# Patient Record
Sex: MALE | Employment: OTHER | URBAN - METROPOLITAN AREA
[De-identification: names, ages, dates, MRNs, and addresses within clinical notes are randomized per-mention and may not be internally consistent; named-entity substitution may affect disease eponyms.]

---

## 2023-09-05 ENCOUNTER — OFFICE VISIT (OUTPATIENT)
Dept: PRIMARY CARE | Facility: CLINIC | Age: 78
End: 2023-09-05
Payer: MEDICARE

## 2023-09-05 VITALS
BODY MASS INDEX: 26.97 KG/M2 | DIASTOLIC BLOOD PRESSURE: 79 MMHG | HEART RATE: 70 BPM | SYSTOLIC BLOOD PRESSURE: 144 MMHG | TEMPERATURE: 97.3 F | WEIGHT: 171.8 LBS | HEIGHT: 67 IN

## 2023-09-05 DIAGNOSIS — R07.89 ATYPICAL CHEST PAIN: ICD-10-CM

## 2023-09-05 DIAGNOSIS — R07.81 RIB PAIN ON RIGHT SIDE: ICD-10-CM

## 2023-09-05 DIAGNOSIS — E55.9 VITAMIN D DEFICIENCY: ICD-10-CM

## 2023-09-05 DIAGNOSIS — E78.5 HYPERLIPIDEMIA, UNSPECIFIED HYPERLIPIDEMIA TYPE: ICD-10-CM

## 2023-09-05 DIAGNOSIS — Z12.5 PROSTATE CANCER SCREENING: ICD-10-CM

## 2023-09-05 DIAGNOSIS — R53.83 OTHER FATIGUE: ICD-10-CM

## 2023-09-05 PROCEDURE — 1159F MED LIST DOCD IN RCRD: CPT | Performed by: FAMILY MEDICINE

## 2023-09-05 PROCEDURE — 1160F RVW MEDS BY RX/DR IN RCRD: CPT | Performed by: FAMILY MEDICINE

## 2023-09-05 PROCEDURE — 99213 OFFICE O/P EST LOW 20 MIN: CPT | Performed by: FAMILY MEDICINE

## 2023-09-05 PROCEDURE — 1036F TOBACCO NON-USER: CPT | Performed by: FAMILY MEDICINE

## 2023-09-05 RX ORDER — MULTIVIT-MIN/FA/LYCOPEN/LUTEIN .4-300-25
1 TABLET ORAL DAILY
COMMUNITY
Start: 2016-06-01

## 2023-09-05 RX ORDER — FERROUS SULFATE, DRIED 160(50) MG
TABLET, EXTENDED RELEASE ORAL
COMMUNITY
Start: 2019-09-11

## 2023-09-05 RX ORDER — VIT C/E/ZN/COPPR/LUTEIN/ZEAXAN 250MG-90MG
CAPSULE ORAL
COMMUNITY
Start: 2016-06-01

## 2023-09-05 RX ORDER — SPIRONOLACTONE 25 MG
TABLET ORAL
COMMUNITY
Start: 2016-06-01

## 2023-09-05 RX ORDER — MULTIVIT-MIN/IRON/FOLIC ACID/K 18-600-40
1 CAPSULE ORAL DAILY
COMMUNITY
Start: 2016-06-01

## 2023-09-05 ASSESSMENT — PATIENT HEALTH QUESTIONNAIRE - PHQ9
2. FEELING DOWN, DEPRESSED OR HOPELESS: NOT AT ALL
1. LITTLE INTEREST OR PLEASURE IN DOING THINGS: NOT AT ALL
SUM OF ALL RESPONSES TO PHQ9 QUESTIONS 1 AND 2: 0

## 2023-09-05 NOTE — PROGRESS NOTES
"Subjective   Patient ID: Neymar Black is a 78 y.o. male who presents for Chest Pain (Right lower rib pain for 4-5 months./).    HPI   78 year-old male presents co a palpable lump right anterior lower rib with on and off pain for past 4-5 mo  No recent injury but did have a piece of wood kick back from his table saw and hit him in this area a couple yrs ago    Tender to the touch  No redness   Slight swelling  Occ feels on right side as well  No numbness or tingling  No rash  Not assoc with eating; no nausea or vomitting or reflux  Pain can range from 0-10  No assoc sob or breathing trouble  Did have some work exposure in the past    Saw dr arias 2 yr ago and was told he had a lipoma in right axillary area- may be getting  little bigger per pt      Also saw him for biliary dyskinesia at the time but mild symptoms at that time and no recent issues.   Had a normal EGD  and US but HIDA showed dec EF     Last seen 10/2022 for MWV      hx BPH- does not see a urologist  hx RICK-uses cpap sees dr paulson   hx palpitations- sees cardio dr lu  monitors bp/echo  bicuspid aortic valve     hx HLD- not on any meds     BMI 26; wt is stable  had weighed 300lbs in past.   exercises and trying to eat a healthy diet     Flu shot-will get today  Other immunizations are UTD  .  he denies any chest pains, palpitations, edema, shortness of breath, abdominal pains, reflux, nausea, vomiting, diarrhea, constipation, blood in stool, melena.      8/2019 Colonoscopy f;u 5 yrs; [facility]hman     monitors  BP at home and is good      Review of Systems  ROS as stated in HPI    Objective   /79   Pulse 70   Temp 36.3 °C (97.3 °F)   Ht 1.702 m (5' 7\")   Wt 77.9 kg (171 lb 12.8 oz)   BMI 26.91 kg/m²     Physical Exam  Constitutional: A&O; NAD  Heart: RRR     Chest: Lungs CTA bilateral;  right lower anterior rib with point tenderness and slight swelling; no redness or warmth; no rash  Abd: Soft, Nontender, Nondistended  Neuro: No gross " neuro deficits     Psych: Judgment, orientation, mood, affect, insight wnl   Assessment/Plan   Problem List Items Addressed This Visit    None  Visit Diagnoses       Rib pain on left side    -  Primary    Relevant Orders    XR chest 2 views    XR ribs left 2 views    Atypical chest pain        Relevant Orders    XR chest 2 views    XR ribs left 2 views    Prostate cancer screening        Relevant Orders    Prostate Specific Antigen, Screen    Other fatigue        Relevant Orders    CBC    Hyperlipidemia, unspecified hyperlipidemia type        Relevant Orders    Lipid Panel    Comprehensive Metabolic Panel    Vitamin D deficiency        Relevant Orders    Vitamin D 25-Hydroxy,Total (for eval of Vitamin D levels)          Check xray left ribs and CXR  Fu with dr arias re: lipoma   Check labs prior to MWV  Fu in nov for MWV  8/2019 Colonoscopy f;u 5 yrs; [facility]  Flu shot given today  Other immunizations are UTD   healthy lifestyle-diet, exercise.   f/u with specialists  Cont to monitor BP

## 2023-09-06 DIAGNOSIS — R07.81 RIB PAIN ON RIGHT SIDE: ICD-10-CM

## 2023-09-12 ENCOUNTER — TELEPHONE (OUTPATIENT)
Dept: PRIMARY CARE | Facility: CLINIC | Age: 78
End: 2023-09-12
Payer: MEDICARE

## 2023-09-12 NOTE — TELEPHONE ENCOUNTER
Patient's chest x-ray was clear   Please let pt know his right rib xray was normal.  I recommend he fu if persists or worsens

## 2023-10-18 LAB
NON-UH HIE A/G RATIO: 1.6
NON-UH HIE ALB: 3.9 G/DL (ref 3.4–5)
NON-UH HIE ALK PHOS: 59 UNIT/L (ref 45–117)
NON-UH HIE BILIRUBIN, TOTAL: 1.7 MG/DL (ref 0.3–1.2)
NON-UH HIE BUN/CREAT RATIO: 20
NON-UH HIE BUN: 24 MG/DL (ref 9–23)
NON-UH HIE CALCIUM: 9.3 MG/DL (ref 8.7–10.4)
NON-UH HIE CALCULATED LDL CHOLESTEROL: 142 MG/DL (ref 60–130)
NON-UH HIE CALCULATED OSMOLALITY: 285 MOSM/KG (ref 275–295)
NON-UH HIE CHLORIDE: 105 MMOL/L (ref 98–107)
NON-UH HIE CHOLESTEROL: 207 MG/DL (ref 100–200)
NON-UH HIE CO2, VENOUS: 29 MMOL/L (ref 20–31)
NON-UH HIE CREATININE: 1.2 MG/DL (ref 0.6–1.1)
NON-UH HIE GFR AA: >60
NON-UH HIE GLOBULIN: 2.5 G/DL
NON-UH HIE GLOMERULAR FILTRATION RATE: 59 ML/MIN/1.73M?
NON-UH HIE GLUCOSE: 83 MG/DL (ref 74–106)
NON-UH HIE GOT: 31 UNIT/L (ref 15–37)
NON-UH HIE GPT: 24 UNIT/L (ref 10–49)
NON-UH HIE HCT: 44.4 % (ref 41–52)
NON-UH HIE HDL CHOLESTEROL: 50 MG/DL (ref 40–60)
NON-UH HIE HGB: 15.2 G/DL (ref 13.5–17.5)
NON-UH HIE INSTR WBC ND: 6.3
NON-UH HIE K: 4 MMOL/L (ref 3.5–5.1)
NON-UH HIE MCH: 32.5 PG (ref 27–34)
NON-UH HIE MCHC: 34.3 G/DL (ref 32–37)
NON-UH HIE MCV: 94.8 FL (ref 80–100)
NON-UH HIE MPV: 7.7 FL (ref 7.4–10.4)
NON-UH HIE NA: 141 MMOL/L (ref 135–145)
NON-UH HIE PLATELET: 200 X10 (ref 150–450)
NON-UH HIE PROSTATIC SPECIFIC AG SCREEN: 0.8 NG/ML (ref 0–4)
NON-UH HIE RBC: 4.69 X10 (ref 4.7–6.1)
NON-UH HIE RDW: 13.1 % (ref 11.5–14.5)
NON-UH HIE TOTAL CHOL/HDL CHOL RATIO: 4.1
NON-UH HIE TOTAL PROTEIN: 6.4 G/DL (ref 5.7–8.2)
NON-UH HIE TRIGLYCERIDES: 75 MG/DL (ref 30–150)
NON-UH HIE VIT D 25: 62 NG/ML
NON-UH HIE WBC: 6.3 X10 (ref 4.5–11)

## 2023-11-05 NOTE — PROGRESS NOTES
"Subjective   Reason for Visit: Neymar Black is an 78 y.o. male here for a Medicare Wellness visit.     Past Medical, Surgical, and Family History reviewed and updated in chart.    Reviewed all medications by prescribing practitioner or clinical pharmacist (such as prescriptions, OTCs, herbal therapies and supplements) and documented in the medical record.    HPI  78-year-old male presents for Medicare wellness visit      recent labs:       Cr 1.2 bili 1.7   Vit D, psa, cbc wnl     hx BPH- does not see a urologist  PSA screen in oct 2021 showed a significant increase and was at upper end of normal so repeat total and free PSA in nov were ok.   had repeated again recently and was normal   does co nocturia every 2 hrs; no dysuria,   hx RICK-uses cpap sees dr paulson     hx palpitations- sees cardio dr lu  monitors bp/echo  bicuspid aortic valve    hx HLD- not on any meds     BMI 26; wt is stable  had weighed 300lbs in past.   exercises and trying to eat a healthy diet     immunizations are UTD  Planning to get RSV    he denies any chest pains, palpitations, edema, shortness of breath,  reflux, nausea, vomiting, diarrhea, constipation, blood in stool, melena.      8/2019 Colonoscopy f;u 5 yrs; [facility]     sees optho  sees dentist every 6 mo     sees derm for skin checks- saul   no hx of skin CA    Hx of elevated Tbili  Has seen [facility] rahman and gen surg  Had US and HIDA and saw gen surg in 2020  Co persistent RUQ abd pains        /85   Pulse 70   Temp 36.1 °C (97 °F)   Ht 1.702 m (5' 7\")   Wt 77.6 kg (171 lb)   BMI 26.78 kg/m²     Patient Self Assessment of Health Status  Patient Self Assessment: Good    Nutrition and Exercise  Current Diet: Well Balanced Diet  Adequate Fluid Intake: Yes  Caffeine: Yes  Exercise Frequency: Regularly    Functional Ability/Level of Safety  Cognitive Impairment Observed: No cognitive impairment observed    Home Safety Risk Factors: None    Patient Care " "Team:  Yessi Cedillo DO as PCP - General (Family Medicine)  Yessi Cedillo DO as PCP - [employer] PCP     Review of Systems  ROS as stated in HPI    Medicare Wellness Billing Compliance Satisfied    *This is a visual tool to show completion of required items on the day of the visit. Green checks will only appear on the date of visit.    Objective     Vitals:  /85   Pulse 70   Temp 36.1 °C (97 °F)   Ht 1.702 m (5' 7\")   Wt 77.6 kg (171 lb)   BMI 26.78 kg/m²       Physical Exam     Constitutional: A&O; NAD  HEENT: conjunctiva normal. EOMI; PERRLA; lids normal; TM's clear;   Neck: supple; No lymphadenopathy   Heart: RRR     Lungs: CTA bilateral   Abd: Soft, Nontender, Nondistended  Ext:  No edema;    Neuro: No gross neuro deficits     Psych: Judgment, orientation, mood, affect, insight wnl   Assessment/Plan   Problem List Items Addressed This Visit    None  Visit Diagnoses       Medicare annual wellness visit, subsequent    -  Primary    Serum total bilirubin elevated        Relevant Orders    Hepatic Function Panel    Other fatigue        Relevant Orders    TSH with reflex to Free T4 if abnormal        8/2019 Colonoscopy f;u 5 yrs; due 8/2024 [facility]  immunizations are UTD  Planning to get RSV   healthy lifestyle-diet, exercise.   reviewed recent labs.  Recheck bili and requests TSH and fu to discuss further  f/u with specialists  Recommend fu with urology         "

## 2023-11-06 ENCOUNTER — OFFICE VISIT (OUTPATIENT)
Dept: PRIMARY CARE | Facility: CLINIC | Age: 78
End: 2023-11-06
Payer: MEDICARE

## 2023-11-06 VITALS
DIASTOLIC BLOOD PRESSURE: 85 MMHG | TEMPERATURE: 97 F | SYSTOLIC BLOOD PRESSURE: 155 MMHG | HEIGHT: 67 IN | WEIGHT: 171 LBS | BODY MASS INDEX: 26.84 KG/M2 | HEART RATE: 70 BPM

## 2023-11-06 DIAGNOSIS — R53.83 OTHER FATIGUE: ICD-10-CM

## 2023-11-06 DIAGNOSIS — Z00.00 MEDICARE ANNUAL WELLNESS VISIT, SUBSEQUENT: Primary | ICD-10-CM

## 2023-11-06 DIAGNOSIS — R17 SERUM TOTAL BILIRUBIN ELEVATED: ICD-10-CM

## 2023-11-06 LAB
NON-UH HIE ALB: 4.1 G/DL (ref 3.4–5)
NON-UH HIE ALK PHOS: 63 UNIT/L (ref 45–117)
NON-UH HIE BILIRUBIN, DIRECT: 0.4 MG/DL (ref 0–0.4)
NON-UH HIE BILIRUBIN, TOTAL: 1.6 MG/DL (ref 0.3–1.2)
NON-UH HIE GOT: 31 UNIT/L (ref 15–37)
NON-UH HIE GPT: 24 UNIT/L (ref 10–49)
NON-UH HIE TOTAL PROTEIN: 6.9 G/DL (ref 5.7–8.2)
NON-UH HIE TSH: 1.68 UIU/ML (ref 0.55–4.78)

## 2023-11-06 PROCEDURE — 1160F RVW MEDS BY RX/DR IN RCRD: CPT | Performed by: FAMILY MEDICINE

## 2023-11-06 PROCEDURE — 1159F MED LIST DOCD IN RCRD: CPT | Performed by: FAMILY MEDICINE

## 2023-11-06 PROCEDURE — 1036F TOBACCO NON-USER: CPT | Performed by: FAMILY MEDICINE

## 2023-11-06 PROCEDURE — G0439 PPPS, SUBSEQ VISIT: HCPCS | Performed by: FAMILY MEDICINE

## 2023-11-06 PROCEDURE — 1170F FXNL STATUS ASSESSED: CPT | Performed by: FAMILY MEDICINE

## 2023-11-06 ASSESSMENT — PATIENT HEALTH QUESTIONNAIRE - PHQ9
SUM OF ALL RESPONSES TO PHQ9 QUESTIONS 1 AND 2: 0
1. LITTLE INTEREST OR PLEASURE IN DOING THINGS: NOT AT ALL
2. FEELING DOWN, DEPRESSED OR HOPELESS: NOT AT ALL

## 2023-11-06 ASSESSMENT — ACTIVITIES OF DAILY LIVING (ADL)
DRESSING: INDEPENDENT
BATHING: INDEPENDENT
TAKING_MEDICATION: INDEPENDENT
MANAGING_FINANCES: INDEPENDENT
DOING_HOUSEWORK: INDEPENDENT
GROCERY_SHOPPING: INDEPENDENT

## 2023-11-09 ENCOUNTER — TELEPHONE (OUTPATIENT)
Dept: PRIMARY CARE | Facility: CLINIC | Age: 78
End: 2023-11-09
Payer: MEDICARE

## 2023-11-09 NOTE — TELEPHONE ENCOUNTER
----- Message from Yessi Cedillo DO sent at 11/8/2023  9:14 AM EST -----  Let patient know his thyroid levels were normal.  His repeat bilirubin was again slightly elevated at 1.6.  Normal range is less than 1.2.    I know patient scheduled a follow-up appointment to further discuss his bilirubin but after further review of his prior testing I think it would be more beneficial for him to schedule a follow-up with his GI to discuss this further or we could enter a referral for a liver specialist if he prefers.

## 2023-12-11 ENCOUNTER — OFFICE VISIT (OUTPATIENT)
Dept: PRIMARY CARE | Facility: CLINIC | Age: 78
End: 2023-12-11
Payer: MEDICARE

## 2023-12-11 VITALS
DIASTOLIC BLOOD PRESSURE: 80 MMHG | BODY MASS INDEX: 26.97 KG/M2 | TEMPERATURE: 97.8 F | HEIGHT: 67 IN | SYSTOLIC BLOOD PRESSURE: 138 MMHG | WEIGHT: 171.8 LBS | HEART RATE: 70 BPM

## 2023-12-11 DIAGNOSIS — R10.11 RIGHT UPPER QUADRANT ABDOMINAL PAIN: Primary | ICD-10-CM

## 2023-12-11 DIAGNOSIS — K82.8 BILIARY DYSKINESIA: ICD-10-CM

## 2023-12-11 PROBLEM — E80.6 HYPERBILIRUBINEMIA: Status: ACTIVE | Noted: 2023-12-11

## 2023-12-11 PROBLEM — E78.5 HYPERLIPIDEMIA: Status: ACTIVE | Noted: 2023-12-11

## 2023-12-11 PROCEDURE — 99213 OFFICE O/P EST LOW 20 MIN: CPT | Performed by: FAMILY MEDICINE

## 2023-12-11 PROCEDURE — 1036F TOBACCO NON-USER: CPT | Performed by: FAMILY MEDICINE

## 2023-12-11 PROCEDURE — 1159F MED LIST DOCD IN RCRD: CPT | Performed by: FAMILY MEDICINE

## 2023-12-11 PROCEDURE — 1160F RVW MEDS BY RX/DR IN RCRD: CPT | Performed by: FAMILY MEDICINE

## 2023-12-11 NOTE — PROGRESS NOTES
"Subjective   Patient ID: Neymar Black is a 78 y.o. male who presents for Follow-up.    HPI   79 yo male presents with his wife for fu on RUQ abd discomfort that he has had on and off since around 2016     Has 2 types of pains- one is sharp/pinpoint and he attribute to the gallbladder and the other is more generalized RUQ discomfort  Both assoc with eating      Has seen [facility] and gen surg Dr Eaton  2020 biliary US-normal  2020 HIDA-showed decreased ejection fraction  saw gen surg in 2020- did not recommend surgery at that time.   Had EGD in 2020- showed some duodenitis and gastritis  denies any reflux, nausea, vomiting, diarrhea, constipation, blood in stool, melena.      Cut down on portions, acidic foods and alcohol and his pain has improved  No wt loss.   Hx of slightly elevated Tbili but the rest of his liver enzymes have been noraml  Has appt with GI in jan 8/2019 Colonoscopy f;u 5 yrs; [facility] rahman    Review of Systems  ROS as stated in HPI    Objective   /80   Pulse 70   Temp 36.6 °C (97.8 °F)   Ht 1.702 m (5' 7\")   Wt 77.9 kg (171 lb 12.8 oz)   BMI 26.91 kg/m²     Physical Exam  Constitutional: A&O; NAD  Heart: RRR     Lungs: CTA bilateral   Abd: Soft, Nontender, Nondistended   Neuro: No gross neuro deficits     Psych: Judgment, orientation, mood, affect, insight wnl   Assessment/Plan   Problem List Items Addressed This Visit    None    Reviewed 2020 US/HIDA/EGD  Reviewed recent labs  Has upcoming appt with GI in Jan; may need repeat US/HIDA/EGD- prefers to hold off on ordering any tests at this time but will discuss further with GI   8/2019 Colonoscopy fu 5 yrs  Monitor BP   "

## 2024-10-14 DIAGNOSIS — R53.83 OTHER FATIGUE: ICD-10-CM

## 2024-10-14 DIAGNOSIS — Z12.5 PROSTATE CANCER SCREENING: Primary | ICD-10-CM

## 2024-10-14 DIAGNOSIS — E78.5 HYPERLIPIDEMIA, UNSPECIFIED HYPERLIPIDEMIA TYPE: ICD-10-CM

## 2024-10-14 DIAGNOSIS — E55.9 VITAMIN D DEFICIENCY: ICD-10-CM

## 2024-10-28 LAB
NON-UH HIE A/G RATIO: 1.3
NON-UH HIE ALB: 3.7 G/DL (ref 3.4–5)
NON-UH HIE ALK PHOS: 59 UNIT/L (ref 45–117)
NON-UH HIE BILIRUBIN, TOTAL: 1.7 MG/DL (ref 0.3–1.2)
NON-UH HIE BUN/CREAT RATIO: 18.3
NON-UH HIE BUN: 22 MG/DL (ref 9–23)
NON-UH HIE CALCIUM: 9.6 MG/DL (ref 8.7–10.4)
NON-UH HIE CALCULATED LDL CHOLESTEROL: 145 MG/DL (ref 60–130)
NON-UH HIE CALCULATED OSMOLALITY: 284 MOSM/KG (ref 275–295)
NON-UH HIE CHLORIDE: 106 MMOL/L (ref 98–107)
NON-UH HIE CHOLESTEROL: 211 MG/DL (ref 100–200)
NON-UH HIE CO2, VENOUS: 31 MMOL/L (ref 20–31)
NON-UH HIE CREATININE: 1.2 MG/DL (ref 0.6–1.1)
NON-UH HIE GFR AA: >60
NON-UH HIE GLOBULIN: 2.9 G/DL
NON-UH HIE GLOMERULAR FILTRATION RATE: 59 ML/MIN/1.73M?
NON-UH HIE GLUCOSE: 91 MG/DL (ref 74–106)
NON-UH HIE GOT: 30 UNIT/L (ref 15–37)
NON-UH HIE GPT: 22 UNIT/L (ref 10–49)
NON-UH HIE HCT: 45 % (ref 41–52)
NON-UH HIE HDL CHOLESTEROL: 52 MG/DL (ref 40–60)
NON-UH HIE HGB: 15.3 G/DL (ref 13.5–17.5)
NON-UH HIE INSTR WBC ND: 6.2
NON-UH HIE K: 4.2 MMOL/L (ref 3.5–5.1)
NON-UH HIE MCH: 32 PG (ref 27–34)
NON-UH HIE MCHC: 34.1 G/DL (ref 32–37)
NON-UH HIE MCV: 93.9 FL (ref 80–100)
NON-UH HIE MPV: 7.4 FL (ref 7.4–10.4)
NON-UH HIE NA: 141 MMOL/L (ref 135–145)
NON-UH HIE PLATELET: 207 X10 (ref 150–450)
NON-UH HIE PROSTATIC SPECIFIC AG SCREEN: 0.9 NG/ML (ref 0–4)
NON-UH HIE RBC: 4.79 X10 (ref 4.7–6.1)
NON-UH HIE RDW: 13.7 % (ref 11.5–14.5)
NON-UH HIE TOTAL CHOL/HDL CHOL RATIO: 4.1
NON-UH HIE TOTAL PROTEIN: 6.6 G/DL (ref 5.7–8.2)
NON-UH HIE TRIGLYCERIDES: 72 MG/DL (ref 30–150)
NON-UH HIE TSH: 1.72 UIU/ML (ref 0.55–4.78)
NON-UH HIE VIT D 25: 52 NG/ML
NON-UH HIE WBC: 6.2 X10 (ref 4.5–11)

## 2024-11-11 ENCOUNTER — APPOINTMENT (OUTPATIENT)
Dept: PRIMARY CARE | Facility: CLINIC | Age: 79
End: 2024-11-11
Payer: MEDICARE

## 2024-11-11 VITALS
WEIGHT: 170.6 LBS | BODY MASS INDEX: 25.85 KG/M2 | SYSTOLIC BLOOD PRESSURE: 130 MMHG | HEIGHT: 68 IN | DIASTOLIC BLOOD PRESSURE: 78 MMHG | TEMPERATURE: 98.3 F | HEART RATE: 69 BPM

## 2024-11-11 DIAGNOSIS — Z00.00 MEDICARE ANNUAL WELLNESS VISIT, SUBSEQUENT: Primary | ICD-10-CM

## 2024-11-11 PROCEDURE — 1160F RVW MEDS BY RX/DR IN RCRD: CPT | Performed by: FAMILY MEDICINE

## 2024-11-11 PROCEDURE — 1036F TOBACCO NON-USER: CPT | Performed by: FAMILY MEDICINE

## 2024-11-11 PROCEDURE — 1170F FXNL STATUS ASSESSED: CPT | Performed by: FAMILY MEDICINE

## 2024-11-11 PROCEDURE — G0439 PPPS, SUBSEQ VISIT: HCPCS | Performed by: FAMILY MEDICINE

## 2024-11-11 PROCEDURE — 1159F MED LIST DOCD IN RCRD: CPT | Performed by: FAMILY MEDICINE

## 2024-11-11 PROCEDURE — 1123F ACP DISCUSS/DSCN MKR DOCD: CPT | Performed by: FAMILY MEDICINE

## 2024-11-11 PROCEDURE — 1158F ADVNC CARE PLAN TLK DOCD: CPT | Performed by: FAMILY MEDICINE

## 2024-11-11 ASSESSMENT — ACTIVITIES OF DAILY LIVING (ADL)
GROCERY_SHOPPING: INDEPENDENT
DRESSING: INDEPENDENT
MANAGING_FINANCES: INDEPENDENT
TAKING_MEDICATION: INDEPENDENT
DOING_HOUSEWORK: INDEPENDENT
BATHING: INDEPENDENT

## 2024-11-11 ASSESSMENT — PATIENT HEALTH QUESTIONNAIRE - PHQ9
2. FEELING DOWN, DEPRESSED OR HOPELESS: NOT AT ALL
SUM OF ALL RESPONSES TO PHQ9 QUESTIONS 1 AND 2: 0
1. LITTLE INTEREST OR PLEASURE IN DOING THINGS: NOT AT ALL

## 2024-11-11 NOTE — PROGRESS NOTES
"Subjective   Reason for Visit: Neymar Black is an 79 y.o. male here for a Medicare Wellness visit.     Past Medical, Surgical, and Family History reviewed and updated in chart.    Reviewed all medications by prescribing practitioner or clinical pharmacist (such as prescriptions, OTCs, herbal therapies and supplements) and documented in the medical record.    HPI  79-year-old male presents for Medicare wellness visit      recent labs:     Cr 1.2   Tbili 1.7   PSA 0.9   TSH, vit D, cbc wnl    prior      Cr 1.2 bili 1.7   Vit D, psa, cbc wnl     hx BPH- did not fu with a urologist  PSA screen in oct 2021 showed a significant increase and was at upper end of normal so repeat total and free PSA in nov were ok.    does co nocturia every 2 hrs; no dysuria,     hx RICK-uses cpap sees dr paulson     hx palpitations- sees cardio dr lu  monitors bp/echo  bicuspid aortic valve    hx HLD- not on any meds  Due for echo this feb   checks BP at home and avgs 120s/70s    BMI 25; wt is stable  had weighed 300lbs in past.   exercises and trying to eat a healthy diet     immunizations are UTD  Planning to get RSV and prevnar 20    he denies any chest pains, palpitations, edema, shortness of breath,  reflux, nausea, vomiting, diarrhea, constipation, blood in stool, melena.      9/2024  Colonoscopy f;u 5 yrs; [facility]na     sees optho  sees dentist every 6 mo     sees derm for skin checks- saul   no hx of skin CA    Hx of elevated Tbili  Has seen [facility] rahman and gen surg  Had US and HIDA and saw gen surg in 2020  Saw Dr Rahman earlier this yr and was told Alli    /78   Pulse 69   Temp 36.8 °C (98.3 °F)   Ht 1.727 m (5' 8\")   Wt 77.4 kg (170 lb 9.6 oz)   BMI 25.94 kg/m²   Patient Self Assessment of Health Status  Patient Self Assessment: Good    Nutrition and Exercise  Current Diet: Well Balanced Diet  Adequate Fluid Intake: Yes  Caffeine: Yes  Exercise Frequency: Regularly    Functional " "Ability/Level of Safety  Cognitive Impairment Observed: No cognitive impairment observed    Home Safety Risk Factors: None    Patient Care Team:  Yessi Cedillo DO as PCP - General (Family Medicine)  Yessi Cedillo DO as PCP - [employer] PCP     Review of Systems  ROS as stated in HPI    Medicare Wellness Billing Compliance Satisfied    *This is a visual tool to show completion of required items on the day of the visit. Green checks will only appear on the date of visit.    Objective     Vitals:  /78   Pulse 69   Temp 36.8 °C (98.3 °F)   Ht 1.727 m (5' 8\")   Wt 77.4 kg (170 lb 9.6 oz)   BMI 25.94 kg/m²       Physical Exam  Constitutional: A&O; NAD  HEENT: conjunctiva normal. EOMI; PERRLA; lids normal; TM's clear;   Neck: supple; No lymphadenopathy   Heart: RRR     Lungs: CTA bilateral   Abd: Soft, Nontender, Nondistended  Ext:  No edema;    Neuro: No gross neuro deficits     Psych: Judgment, orientation, mood, affect, insight wnl   Assessment/Plan   Problem List Items Addressed This Visit    None  Visit Diagnoses       Medicare annual wellness visit, subsequent    -  Primary            9/2024 Colonoscopy f;u 5 yrs;  [facility] ra[facility]  Planning to get RSV and Prevnar 20 at pharm  Other immunizations are UTD   healthy lifestyle-diet, exercise.   reviewed recent labs.  f/u with specialists  Monitor BP  Fu 1 yr or sooner if needed      "

## 2025-11-17 ENCOUNTER — APPOINTMENT (OUTPATIENT)
Dept: PRIMARY CARE | Facility: CLINIC | Age: 80
End: 2025-11-17
Payer: MEDICARE